# Patient Record
Sex: FEMALE | Race: AMERICAN INDIAN OR ALASKA NATIVE
[De-identification: names, ages, dates, MRNs, and addresses within clinical notes are randomized per-mention and may not be internally consistent; named-entity substitution may affect disease eponyms.]

---
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
## 2021-03-29 ENCOUNTER — HOSPITAL ENCOUNTER (EMERGENCY)
Dept: HOSPITAL 5 - ED | Age: 21
LOS: 1 days | Discharge: HOME | End: 2021-03-30
Payer: SELF-PAY

## 2021-03-29 VITALS — SYSTOLIC BLOOD PRESSURE: 114 MMHG | DIASTOLIC BLOOD PRESSURE: 69 MMHG

## 2021-03-29 DIAGNOSIS — Z79.899: ICD-10-CM

## 2021-03-29 DIAGNOSIS — J45.909: ICD-10-CM

## 2021-03-29 DIAGNOSIS — R10.13: Primary | ICD-10-CM

## 2021-03-29 PROCEDURE — 81001 URINALYSIS AUTO W/SCOPE: CPT

## 2021-03-29 PROCEDURE — 99283 EMERGENCY DEPT VISIT LOW MDM: CPT

## 2021-03-29 PROCEDURE — 81025 URINE PREGNANCY TEST: CPT

## 2021-03-30 LAB
BILIRUB UR QL STRIP: (no result)
BLOOD UR QL VISUAL: (no result)
MUCOUS THREADS #/AREA URNS HPF: (no result) /HPF
PH UR STRIP: 6 [PH] (ref 5–7)
RBC #/AREA URNS HPF: 2 /HPF (ref 0–6)
UROBILINOGEN UR-MCNC: 2 MG/DL (ref ?–2)
WBC #/AREA URNS HPF: 1 /HPF (ref 0–6)

## 2021-03-30 NOTE — EMERGENCY DEPARTMENT REPORT
ED Abdominal Pain HPI





- General


Chief Complaint: Abdominal Pain


Stated Complaint: ABD PAIN


Source: patient


Mode of arrival: Ambulatory


Limitations: No Limitations





- History of Present Illness


Initial Comments: 





Patient is a nulliparous 21-year-old -American female with a history of 

asthma presents to the ED with intermittent abdominal discomfort in the 

epigastric area worse with food in the last 1 week.  Patient states that the 

symptoms have been intermittent since the onset.   Patient specifically was 

concerned that she may be pregnant and would like to be tested for pregnancy 

although her last menstrual cycle was 2 weeks ago. Patient denies nausea, 

vomiting, chest pain, shortness of breath, fever, chills, dysuria, urinary 

frequency and urgency, vaginal discharge, vaginal bleeding, dizziness, cough, 

sore throat, headache or back pain.


MD Complaint: abdominal pain (Abdominal discomfort in the epigastric area)


-: Sudden, week(s) (1)


Location: periumbilical, epigastric


Radiation: none


Migration to: no migration


Severity: mild


Severity scale (0 -10): 2


Quality: aching, dull


Consistency: intermittent


Improves With: nothing


Worsens With: eating


Associated Symptoms: denies other symptoms, anorexia.  denies: nausea, vomiting,

diarrhea, fever, chills, constipation, dysuria, hematemesis, hematochezia, 

melena, hematuria, syncope





- Related Data


LMP Date: 03/09/21


                                  Previous Rx's











 Medication  Instructions  Recorded  Last Taken  Type


 


Dicyclomine [Bentyl] 20 mg PO Q6H PRN #24 tablet 03/30/21 Unknown Rx


 


Famotidine [Pepcid] 20 mg PO BID #30 tablet 03/30/21 Unknown Rx











                                    Allergies











Allergy/AdvReac Type Severity Reaction Status Date / Time


 


No Known Allergies Allergy   Unverified 01/23/20 13:01














ED Review of Systems


ROS: 


Stated complaint: ABD PAIN


Other details as noted in HPI





Constitutional: denies: chills, fever


Eyes: denies: eye pain, eye discharge, vision change


ENT: denies: ear pain, throat pain


Respiratory: denies: cough, shortness of breath, wheezing


Cardiovascular: denies: chest pain, palpitations


Endocrine: no symptoms reported


Gastrointestinal: abdominal pain.  denies: nausea, diarrhea


Genitourinary: denies: urgency, dysuria, discharge


Musculoskeletal: denies: back pain, joint swelling, arthralgia


Skin: denies: rash, lesions


Neurological: denies: headache, weakness, paresthesias


Psychiatric: denies: anxiety, depression


Hematological/Lymphatic: denies: easy bleeding, easy bruising





ED Past Medical Hx





- Past Medical History


Previous Medical History?: Yes


Hx Asthma: Yes





- Surgical History


Past Surgical History?: No





- Social History


Smoking Status: Never Smoker


Substance Use Type: None





- Medications


Home Medications: 


                                Home Medications











 Medication  Instructions  Recorded  Confirmed  Last Taken  Type


 


Dicyclomine [Bentyl] 20 mg PO Q6H PRN #24 tablet 03/30/21  Unknown Rx


 


Famotidine [Pepcid] 20 mg PO BID #30 tablet 03/30/21  Unknown Rx














ED Physical Exam





- General


Limitations: No Limitations


General appearance: alert, in no apparent distress





- Head


Head exam: Present: atraumatic, normocephalic, normal inspection





- Eye


Eye exam: Present: normal appearance, PERRL, EOMI


Pupils: Present: normal accommodation





- ENT


ENT exam: Present: normal exam, normal orophraynx, mucous membranes moist, TM's 

normal bilaterally, normal external ear exam





- Neck


Neck exam: Present: normal inspection, full ROM





- Respiratory


Respiratory exam: Present: normal lung sounds bilaterally.  Absent: respiratory 

distress, wheezes, rales, stridor, chest wall tenderness, accessory muscle use





- Cardiovascular


Cardiovascular Exam: Present: regular rate, normal rhythm, normal heart sounds. 

 Absent: systolic murmur, diastolic murmur, rubs, gallop





- GI/Abdominal


GI/Abdominal exam: Present: soft, normal bowel sounds.  Absent: tenderness, 

guarding, rebound, hyperactive bowel sounds, organomegaly





- Extremities Exam


Extremities exam: Present: normal inspection, full ROM, normal capillary refill





- Back Exam


Back exam: Present: normal inspection, full ROM.  Absent: tenderness, CVA 

tenderness (R), CVA tenderness (L), muscle spasm, paraspinal tenderness





- Neurological Exam


Neurological exam: Present: alert, oriented X3, CN II-XII intact, normal gait, 

reflexes normal





- Psychiatric


Psychiatric exam: Present: normal affect, normal mood





- Skin


Skin exam: Present: warm, dry, intact, normal color.  Absent: rash





ED Course





                                   Vital Signs











  03/29/21





  23:39


 


Temperature 98.7 F


 


Pulse Rate 88


 


Respiratory 18





Rate 


 


Blood Pressure 114/69


 


O2 Sat by Pulse 100





Oximetry 














ED Medical Decision Making





- Medical Decision Making





This is a nulliparous 21-year-old -American female with a history of 

asthma presents to the ED with intermittent abdominal discomfort in the 

epigastric area worse with food in the last 1 week.  Patient states that the 

symptoms have been intermittent since the onset.  Patient specifically was 

concerned that she may be pregnant and would like to be tested for pregnancy 

although her last menstrual cycle was 2 weeks ago.  In the ED, patient is alert 

and oriented x3 and is not in distress.  Lab test results were reviewed and are 

all nonactionable including urinalysis.  Based on the history of the patient and

 the physical exam findings, patient symptoms are likely due to dyspepsia or 

GERD complications.  Patient was discharged home on medications and advised to 

follow-up with her primary care physician in 5 to 7 days for reevaluation.  

Patient was advised to return to the ED immediately if her symptoms get worse.





- Differential Diagnosis


GERD; UTI; pregnancy;


Critical care attestation.: 


If time is entered above; I have spent that time in minutes in the direct care o

f this critically ill patient, excluding procedure time.








ED Disposition


Clinical Impression: 


Abdominal pain


Qualifiers:


 Abdominal location: generalized Qualified Code(s): R10.84 - Generalized 

abdominal pain





Disposition: DC-01 TO HOME OR SELFCARE


Is pt being admited?: No


Does the pt Need Aspirin: No


Condition: Stable


Instructions:  Abdominal Pain (ED), Abdominal Pain, Adult, Easy-to-Read


Additional Instructions: 


Urinalysis is unremarkable and urine pregnancy test was negative.  Your symptoms

 are likely due to acid reflux.  Therefore take medication with food, drink 

plenty of fluids and follow-up with your primary care physician in 5 to 7 days 

for reevaluation.  Return to the ED immediately if symptoms get worse.


Prescriptions: 


Dicyclomine [Bentyl] 20 mg PO Q6H PRN #24 tablet


 PRN Reason: Abdominal pain


Famotidine [Pepcid] 20 mg PO BID #30 tablet


Referrals: 


[facility] [Provider Group] - 3-5 Days


Forms:  Work/School Release Form(ED)


Time of Disposition: 03:11


Print Language: ENGLISH